# Patient Record
Sex: MALE | Race: OTHER
[De-identification: names, ages, dates, MRNs, and addresses within clinical notes are randomized per-mention and may not be internally consistent; named-entity substitution may affect disease eponyms.]

---

## 2019-01-28 PROBLEM — Z00.00 ENCOUNTER FOR PREVENTIVE HEALTH EXAMINATION: Status: ACTIVE | Noted: 2019-01-28

## 2019-01-31 ENCOUNTER — APPOINTMENT (OUTPATIENT)
Dept: NEUROLOGY | Facility: CLINIC | Age: 39
End: 2019-01-31
Payer: COMMERCIAL

## 2019-01-31 VITALS
HEIGHT: 69 IN | BODY MASS INDEX: 31.99 KG/M2 | SYSTOLIC BLOOD PRESSURE: 119 MMHG | DIASTOLIC BLOOD PRESSURE: 77 MMHG | TEMPERATURE: 97.3 F | WEIGHT: 216 LBS | HEART RATE: 67 BPM

## 2019-01-31 DIAGNOSIS — Z87.891 PERSONAL HISTORY OF NICOTINE DEPENDENCE: ICD-10-CM

## 2019-01-31 DIAGNOSIS — Z78.9 OTHER SPECIFIED HEALTH STATUS: ICD-10-CM

## 2019-01-31 DIAGNOSIS — H93.A9 PULSATILE TINNITUS, UNSPECIFIED EAR: ICD-10-CM

## 2019-01-31 DIAGNOSIS — R29.818 OTHER SYMPTOMS AND SIGNS INVOLVING THE NERVOUS SYSTEM: ICD-10-CM

## 2019-01-31 DIAGNOSIS — Z83.3 FAMILY HISTORY OF DIABETES MELLITUS: ICD-10-CM

## 2019-01-31 DIAGNOSIS — R42 DIZZINESS AND GIDDINESS: ICD-10-CM

## 2019-01-31 DIAGNOSIS — G43.909 MIGRAINE, UNSPECIFIED, NOT INTRACTABLE, W/OUT STATUS MIGRAINOSUS: ICD-10-CM

## 2019-01-31 PROCEDURE — 99245 OFF/OP CONSLTJ NEW/EST HI 55: CPT

## 2019-01-31 RX ORDER — FEXOFENADINE HCL 60 MG
CAPSULE ORAL
Refills: 0 | Status: ACTIVE | COMMUNITY

## 2019-01-31 NOTE — HISTORY OF PRESENT ILLNESS
[FreeTextEntry1] : This is a 38 year old RH man who is being seen in neurologic consultation for evaluation of dizziness.  In Mid-December he noted a slow onset of dizziness which is more of an issue with his balance.  He does not describe the room spinning or moving.  No loss of hearing.  Does note a "swooshing" sound in his ear. When he experiences dizziness he also notes that sometimes his legs feel weak and he has to sit down and close his eyes.  Episode occurred once with driving. No nausea or vomiting.  He notes a mild headache which is present daily but responds to OTC Aleve.  Headache is in the center of the forehead and travels to the crown of the head.  No auras. No light or sound sensitivity.  Saw ENT yesterday- advised to stop Claritin D and try Allegra and a nose spray.  No numbness. No tingling.  No urinary complaints. No neck pain.\gaye Despite this was able to ski this past weekend without issues.  No falls.  \par Does drink regularly about 6-8 drinks per week.  Has not been doing so of late because of his symptoms.

## 2019-01-31 NOTE — REVIEW OF SYSTEMS
[Dizziness] : dizziness [Negative] : Heme/Lymph [As Noted in HPI] : as noted in HPI [de-identified] : allergies [de-identified] : feel of balance,headaches,joint pain,back pain

## 2019-01-31 NOTE — ASSESSMENT
[FreeTextEntry1] : Patient was to get out patient testing.  As he was checking out at the office, he had sudden onset of loss of balance and staff noted him to slur his speech.  Exam was unchanged.  At this time, he was escorted to the [facility] ED by staff.  Recommendations forthcoming based on testing and clinical picture.

## 2019-01-31 NOTE — PHYSICAL EXAM
[FreeTextEntry1] : Physical examination \par General: No acute distress, Awake, Alert. \par Fundus: disc margins sharp. \par Neck: no Carotid bruit. \par Cardiovascular: Normal rate, Regular rhythm, No murmur. \par Chest - clear.\par Ears- TM clear bilaterally\par \par Mental status \par Awake, alert, and oriented to person, time and place, Normal attention span and concentration, Recent and remote memory intact, Language intact, Fund of knowledge intact. \par Cranial Nerves \par II: VFF \par III, IV, VI: PERRL, EOMI. \par V: Facial sensation is normal B/L. \par VII: Facial strength is normal B/L. \par VIII: Gross hearing is intact. \par IX, X: Palate is midline and elevates symmetrically. \par XI: Trapezius normal strength. \par XII: Tongue midline without atrophy or fasciculations. \par \par Motor exam \par Muscle tone - no evidence of rigidity or resistance in all 4 extremities. \par No atrophy or fasciculations \par Muscle Strength: arms and legs, proximal and distal flexors and extensors are normal.\par No UE drift.\par \par Reflexes \par All present, normal, and symmetrical.  \par Plantars right: mute. \par Plantars left: mute. \par  \par Coordination \par Finger to nose: Normal. \par Heel to shin: Normal. \par  \par Sensory \par Intact sensation to vibration and cold.\par  \par Gait \par Normal including heels, toes, and tandem gait.  Heel to toe was difficult for patient but accurate.\par Positive Romberg.\par  \par \par

## 2019-02-13 ENCOUNTER — MESSAGE (OUTPATIENT)
Age: 39
End: 2019-02-13